# Patient Record
Sex: MALE | Race: BLACK OR AFRICAN AMERICAN | Employment: FULL TIME | URBAN - METROPOLITAN AREA
[De-identification: names, ages, dates, MRNs, and addresses within clinical notes are randomized per-mention and may not be internally consistent; named-entity substitution may affect disease eponyms.]

---

## 2017-02-09 RX ORDER — TRAZODONE HYDROCHLORIDE 50 MG/1
TABLET ORAL
Qty: 30 TAB | Refills: 5 | Status: SHIPPED | OUTPATIENT
Start: 2017-02-09 | End: 2017-03-14 | Stop reason: SDUPTHER

## 2017-02-10 RX ORDER — VALSARTAN AND HYDROCHLOROTHIAZIDE 80; 12.5 MG/1; MG/1
TABLET, FILM COATED ORAL
Qty: 30 TAB | Refills: 5 | Status: SHIPPED | OUTPATIENT
Start: 2017-02-10 | End: 2017-03-14 | Stop reason: ALTCHOICE

## 2017-03-14 ENCOUNTER — OFFICE VISIT (OUTPATIENT)
Dept: FAMILY MEDICINE CLINIC | Age: 72
End: 2017-03-14

## 2017-03-14 VITALS
BODY MASS INDEX: 27.57 KG/M2 | RESPIRATION RATE: 24 BRPM | DIASTOLIC BLOOD PRESSURE: 76 MMHG | HEART RATE: 70 BPM | TEMPERATURE: 97.9 F | HEIGHT: 70 IN | OXYGEN SATURATION: 95 % | SYSTOLIC BLOOD PRESSURE: 116 MMHG | WEIGHT: 192.6 LBS

## 2017-03-14 DIAGNOSIS — M15.9 PRIMARY OSTEOARTHRITIS INVOLVING MULTIPLE JOINTS: ICD-10-CM

## 2017-03-14 DIAGNOSIS — E78.2 MIXED HYPERLIPIDEMIA: ICD-10-CM

## 2017-03-14 DIAGNOSIS — G47.00 INSOMNIA, UNSPECIFIED TYPE: ICD-10-CM

## 2017-03-14 DIAGNOSIS — D64.9 NORMOCYTIC NORMOCHROMIC ANEMIA: ICD-10-CM

## 2017-03-14 DIAGNOSIS — C61 PROSTATE CANCER (HCC): ICD-10-CM

## 2017-03-14 DIAGNOSIS — I10 ESSENTIAL HYPERTENSION: Primary | ICD-10-CM

## 2017-03-14 DIAGNOSIS — H35.52 RETINITIS PIGMENTOSA: ICD-10-CM

## 2017-03-14 RX ORDER — TRAZODONE HYDROCHLORIDE 50 MG/1
TABLET ORAL
Qty: 30 TAB | Refills: 11 | Status: SHIPPED | OUTPATIENT
Start: 2017-03-14 | End: 2017-04-14 | Stop reason: SDUPTHER

## 2017-03-14 RX ORDER — AMLODIPINE BESYLATE 5 MG/1
TABLET ORAL
Qty: 30 TAB | Refills: 11 | Status: SHIPPED | OUTPATIENT
Start: 2017-03-14 | End: 2017-04-17 | Stop reason: SDUPTHER

## 2017-03-14 RX ORDER — VALSARTAN 80 MG/1
80 TABLET ORAL DAILY
Qty: 30 TAB | Refills: 11 | Status: SHIPPED | OUTPATIENT
Start: 2017-03-14

## 2017-03-14 NOTE — MR AVS SNAPSHOT
Visit Information Date & Time Provider Department Dept. Phone Encounter #  
 3/14/2017  2:00 PM [facility] Nguyen, [facility] [phone] [id number] Follow-up Instructions Return if symptoms worsen or fail to improve. Upcoming Health Maintenance Date Due Hepatitis C Screening 1945 ZOSTER VACCINE AGE 60> 5/25/2006 GLAUCOMA SCREENING Q2Y 5/25/2011 MEDICARE YEARLY EXAM 5/25/2011 Pneumococcal 65+ High/Highest Risk (2 of 2 - PCV13) 11/17/2014 FOBT Q 1 YEAR AGE 50-75 11/18/2017 DTaP/Tdap/Td series (2 - Td) 3/14/2027 Allergies as of 3/14/2017  Review Complete On: 3/14/2017 By: Corrie Cordero Severity Noted Reaction Type Reactions Accupril [Quinapril]  01/04/2011   Side Effect Nausea and Vomiting \"PT DOESN'T THINK HE IS ALLERGIC TO, HE WAS SICK AT THE SAME TIME\" Current Immunizations  Reviewed on 2/27/2015 Name Date Influenza High Dose Vaccine PF 12/1/2014 Influenza Vaccine 10/1/2013 Pneumococcal Polysaccharide (PPSV-23) 11/17/2013 12:53 PM  
  
 Not reviewed this visit You Were Diagnosed With   
  
 Codes Comments Essential hypertension    -  Primary ICD-10-CM: I10 
ICD-9-CM: 401.9 Mixed hyperlipidemia     ICD-10-CM: E78.2 ICD-9-CM: 272.2 Retinitis pigmentosa     ICD-10-CM: H35.52 
ICD-9-CM: 362.74 Primary osteoarthritis involving multiple joints     ICD-10-CM: M15.0 ICD-9-CM: 715.09 Prostate cancer [facility])     ICD-10-CM: I26 ICD-9-CM: 477 Normocytic normochromic anemia     ICD-10-CM: D64.9 ICD-9-CM: 285.9 Need for hepatitis C screening test     ICD-10-CM: Z11.59 
ICD-9-CM: V73.89 Vitals BP Pulse Temp Resp Height(growth percentile) Weight(growth percentile) 116/76 (BP 1 Location: Right arm, BP Patient Position: Sitting) 70 97.9 °F (36.6 °C) (Oral) 24 5' 10\" (1.778 m) 192 lb 9.6 oz (87.4 kg) SpO2 BMI Smoking Status 95% 27.64 kg/m2 Current Every Day Smoker Vitals History BMI and BSA Data Body Mass Index Body Surface Area  
 27.64 kg/m 2 2.08 m 2 Preferred Pharmacy Pharmacy Name Phone [facility] [address] [phone] Your Updated Medication List  
  
   
This list is accurate as of: 3/14/17  2:31 PM.  Always use your most recent med list. amLODIPine 5 mg tablet Commonly known as:  Arlyss Lyons TAKE 1 TABLET BY MOUTH EVERY DAY EXCEDRIN EXTRA STRENGTH PO Take  by mouth. traZODone 50 mg tablet Commonly known as:  DESYREL  
TAKE 1 TABLET BY MOUTH NIGHTLY.  
  
 valsartan 80 mg tablet Commonly known as:  DIOVAN Take  by mouth daily. zolpidem 5 mg tablet Commonly known as:  AMBIEN  
TAKE 2 TABLETS NIGHTLY AS NEEDED FOR SLEEP We Performed the Following AMB POC URINALYSIS DIP STICK AUTO W/O MICRO [95184 CPT(R)] CBC WITH AUTOMATED DIFF [59979 CPT(R)] FOLATE [id number] CPT(R)] HEPATITIS C AB [97481 CPT(R)] LIPID PANEL [89168 CPT(R)] METABOLIC PANEL, COMPREHENSIVE [66487 CPT(R)] RETICULOCYTE COUNT [id number] CPT(R)] VITAMIN B12 [id number] CPT(R)] Follow-up Instructions Return if symptoms worsen or fail to improve. Introducing [facility] [place] [facility]! Dear Cresencio Carr: Thank you for requesting a [employer] account. Our records indicate that you already have an active [employer] account. You can access your account anytime at [email or web address] Did you know that you can access your hospital and ER discharge instructions at any time in [employer]? You can also review all of your test results from your hospital stay or ER visit. Additional Information If you have questions, please visit the Frequently Asked Questions section of the [employer] website at [email or web address] Remember, [employer] is NOT to be used for urgent needs.  For medical emergencies, dial 911. Now available from your iPhone and Android! Please provide this summary of care documentation to your next provider. Your primary care clinician is listed as Jocelin Lr. If you have any questions after today's visit, please call [phone].

## 2017-03-14 NOTE — PROGRESS NOTES
HISTORY OF PRESENT ILLNESS  Rory Mallory is a 80 y.o. male. f/u hbp,oa,prostate ca,retinitis pigmentosa. Doing well,has happily moved to [place]. Has intermittent l lateral hand pain   Hypertension    The history is provided by the patient. This is a chronic problem. The problem has not changed since onset. Pertinent negatives include no chest pain, no orthopnea, no palpitations, no malaise/fatigue, no headaches, no peripheral edema and no shortness of breath. Hand Pain    This is a recurrent problem. The problem occurs daily. The problem has not changed since onset. The pain is present in the left hand. The quality of the pain is described as aching. The pain is at a severity of 3/10. The pain is moderate. Cholesterol Problem   This is a chronic problem. The problem occurs daily. The problem has not changed since onset. Pertinent negatives include no chest pain, no headaches and no shortness of breath. Review of Systems   Constitutional: Negative for fever and malaise/fatigue. Respiratory: Negative for shortness of breath. Cardiovascular: Negative for chest pain, palpitations and orthopnea. Genitourinary: Negative for dysuria, frequency and urgency. Musculoskeletal: Positive for joint pain. Neurological: Negative for headaches. Physical Exam   Constitutional: He is oriented to person, place, and time. He appears well-developed and well-nourished. HENT:   Head: Normocephalic and atraumatic. Right Ear: External ear normal.   Left Ear: External ear normal.   Nose: Nose normal.   Mouth/Throat: Oropharynx is clear and moist.   Eyes: Conjunctivae are normal. Pupils are equal, round, and reactive to light. Neck: Normal range of motion. Cardiovascular: Normal rate and regular rhythm. Pulmonary/Chest: Effort normal and breath sounds normal.   Abdominal: Soft.  Bowel sounds are normal.   Musculoskeletal:        Left elbow: Normal.        Cervical back: He exhibits normal range of motion, no tenderness and no deformity. Left hand: Normal.   Neurological: He is alert and oriented to person, place, and time. Skin: Skin is warm and dry. ASSESSMENT and PLAN  Rosalee Barney was seen today for well male. Diagnoses and all orders for this visit:    Essential hypertension,controlled  -     -     valsartan (DIOVAN) 80 mg tablet; Take 1 Tab by mouth daily. -     amLODIPine (NORVASC) 5 mg tablet; TAKE 1 TABLET BY MOUTH EVERY DAY    Mixed hyperlipidemia      Retinitis pigmentosa    Primary osteoarthritis involving multiple joints    Prostate cancer (HCC),needs uro f/u      Insomnia, unspecified type  -     traZODone (DESYREL) 50 mg tablet; TAKE 1 TABLET BY MOUTH NIGHTLY. Other orders  -         Follow-up Disposition:  Return if symptoms worsen or fail to improve.

## 2017-04-14 DIAGNOSIS — G47.00 INSOMNIA, UNSPECIFIED TYPE: ICD-10-CM

## 2017-04-14 RX ORDER — TRAZODONE HYDROCHLORIDE 50 MG/1
TABLET ORAL
Qty: 90 TAB | Refills: 3 | Status: SHIPPED | OUTPATIENT
Start: 2017-04-14 | End: 2017-04-24 | Stop reason: SDUPTHER

## 2017-04-17 DIAGNOSIS — I10 ESSENTIAL HYPERTENSION: ICD-10-CM

## 2017-04-17 RX ORDER — AMLODIPINE BESYLATE 5 MG/1
TABLET ORAL
Qty: 90 TAB | Refills: 4 | Status: SHIPPED | OUTPATIENT
Start: 2017-04-17 | End: 2017-04-24 | Stop reason: SDUPTHER

## 2017-04-24 DIAGNOSIS — G47.00 INSOMNIA, UNSPECIFIED TYPE: ICD-10-CM

## 2017-04-24 DIAGNOSIS — I10 ESSENTIAL HYPERTENSION: ICD-10-CM

## 2017-04-24 RX ORDER — AMLODIPINE BESYLATE 5 MG/1
TABLET ORAL
Qty: 90 TAB | Refills: 3 | Status: SHIPPED | OUTPATIENT
Start: 2017-04-24

## 2017-04-24 RX ORDER — TRAZODONE HYDROCHLORIDE 50 MG/1
TABLET ORAL
Qty: 90 TAB | Refills: 3 | Status: SHIPPED | OUTPATIENT
Start: 2017-04-24

## 2017-12-27 RX ORDER — SILDENAFIL 100 MG/1
100 TABLET, FILM COATED ORAL AS NEEDED
Qty: 6 TAB | Refills: 11 | Status: SHIPPED | OUTPATIENT
Start: 2017-12-27

## 2017-12-27 NOTE — TELEPHONE ENCOUNTER
Patient would like a refill on his Viagra. Patient can be reached at [phone] if there are any questions.

## 2018-01-03 RX ORDER — VALSARTAN AND HYDROCHLOROTHIAZIDE 80; 12.5 MG/1; MG/1
1 TABLET, FILM COATED ORAL DAILY
Qty: 90 TAB | Refills: 0 | Status: SHIPPED | OUTPATIENT
Start: 2018-01-03 | End: 2018-07-24 | Stop reason: SDUPTHER

## 2018-07-23 ENCOUNTER — OFFICE VISIT (OUTPATIENT)
Dept: INTERNAL MEDICINE | Facility: CLINIC | Age: 73
End: 2018-07-23
Payer: MEDICARE

## 2018-07-23 VITALS
HEIGHT: 70 IN | OXYGEN SATURATION: 96 % | BODY MASS INDEX: 25.06 KG/M2 | TEMPERATURE: 97 F | WEIGHT: 175.06 LBS | SYSTOLIC BLOOD PRESSURE: 117 MMHG | HEART RATE: 122 BPM | DIASTOLIC BLOOD PRESSURE: 89 MMHG

## 2018-07-23 DIAGNOSIS — R63.4 WEIGHT LOSS: ICD-10-CM

## 2018-07-23 DIAGNOSIS — R74.8 ELEVATED SERUM ALKALINE PHOSPHATASE LEVEL: ICD-10-CM

## 2018-07-23 DIAGNOSIS — R53.83 FATIGUE, UNSPECIFIED TYPE: ICD-10-CM

## 2018-07-23 DIAGNOSIS — I10 HYPERTENSION, WELL CONTROLLED: ICD-10-CM

## 2018-07-23 DIAGNOSIS — C61 CANCER OF PROSTATE: Primary | ICD-10-CM

## 2018-07-23 DIAGNOSIS — R97.20 ELEVATED PSA: ICD-10-CM

## 2018-07-23 PROCEDURE — 99204 OFFICE O/P NEW MOD 45 MIN: CPT | Mod: S$GLB,,, | Performed by: FAMILY MEDICINE

## 2018-07-23 PROCEDURE — 99999 PR PBB SHADOW E&M-NEW PATIENT-LVL III: CPT | Mod: PBBFAC,,, | Performed by: FAMILY MEDICINE

## 2018-07-23 RX ORDER — VALSARTAN AND HYDROCHLOROTHIAZIDE 80; 12.5 MG/1; MG/1
TABLET, FILM COATED ORAL
COMMUNITY
Start: 2018-04-28

## 2018-07-23 RX ORDER — DIPHENHYDRAMINE HCL 25 MG
50 TABLET ORAL
COMMUNITY

## 2018-07-23 NOTE — PROGRESS NOTES
Subjective:       Patient ID: Dante Nagy is a 73 y.o. male.    Chief Complaint: Annual Exam    New patient establish care.  Medical history includes hypertension and prostate cancer and retinitis pigmentosa.  He was treated with radical prostatectomy 2007 for prostate cancer.  He has been monitored on annual basis.  PSA 1 year ago was 0.64.  He reports it has slowly increased over the last few years.  He was recently treated in emergency room for probable dehydration.  Lab reports included alk phosphatase elevated 262 and a [facility].  He is scheduled to see Urology in [facility] in 2 days.  He has a 1 month duration fatigue weight loss decreased appetite.  One month ago his weight was 192 and now 175.  He has possible vague bone pain. He denies chest pain headache palpitations shortness of breath.      Review of Systems   Constitutional: Positive for appetite change, fatigue and unexpected weight change. Negative for chills and fever.   Respiratory: Negative for cough, chest tightness, shortness of breath and wheezing.    Cardiovascular: Negative for chest pain, palpitations and leg swelling.        Denies syncope   Gastrointestinal: Negative for abdominal distention, abdominal pain, nausea and vomiting.   Genitourinary: Negative for difficulty urinating, dysuria, frequency, hematuria and urgency.   Musculoskeletal:        Possible vague bone pain including sternal area   Skin: Negative for rash.   Neurological: Negative for dizziness, syncope, speech difficulty and headaches.       Objective:      Physical Exam   Constitutional: He is oriented to person, place, and time. He appears well-developed and well-nourished. No distress.   HENT:   Right Ear: External ear normal.   Left Ear: External ear normal.   Nose: Nose normal.   Mouth/Throat: Oropharynx is clear and moist.   Eyes: Conjunctivae are normal. Pupils are equal, round, and reactive to light.   Neck: Neck supple. No JVD present. No thyromegaly  present.   Cardiovascular: Normal rate, regular rhythm and normal heart sounds.    No murmur heard.  Pulmonary/Chest: Effort normal and breath sounds normal. No respiratory distress. He has no wheezes.   Abdominal: Soft. Bowel sounds are normal. He exhibits no mass. There is no tenderness.   Lymphadenopathy:     He has no cervical adenopathy.   Neurological: He is alert and oriented to person, place, and time. No cranial nerve deficit. He exhibits normal muscle tone. Coordination normal.   Skin: No rash noted. He is not diaphoretic.       Historical on 02/25/2009   Component Date Value Ref Range Status    Troponin I 02/25/2009 <0.006  0.006 - 0.026 ng/ml Final    Glucose 02/25/2009 91  70 - 110 mg/dl Final    BUN, Bld 02/25/2009 13  8 - 23 mg/dl Final    Creatinine 02/25/2009 1.1  0.5 - 1.4 mg/dl Final    Calcium 02/25/2009 10.3  8.7 - 10.5 mg/dl Final    Sodium 02/25/2009 141  136 - 145 mMol/l Final    Potassium 02/25/2009 4.0  3.5 - 5.1 mMol/l Final    Chloride 02/25/2009 104  95 - 110 mMol/l Final    Total Protein 02/25/2009 7.7  6.0 - 8.4 gm/dl Final    Albumin 02/25/2009 5.3* 3.5 - 5.2 g/dl Final    Total Bilirubin 02/25/2009 0.8  0.1 - 1.0 mg/dl Final    AST 02/25/2009 26  10 - 40 U/L Final    Alkaline Phosphatase 02/25/2009 82  55 - 135 U/L Final    CO2 02/25/2009 22* 23.0 - 29.0 mEq/L Final    ALT 02/25/2009 27  10 - 44 U/L Final    CPK MB 02/25/2009 3.0  0.1 - 6.5 ng/mL Final    MB% 02/25/2009 0.5  0 - 5 % Final    CPK 02/25/2009 571* 20 - 200 U/L Final    Salicylate Lvl 02/25/2009 6.0  5.0 - 29.0 mg/dl Final    Alcohol, Medical, Serum 02/25/2009 <10  0 - 10 mg/dl Final    Acetaminophen (Tylenol), Serum 02/25/2009 7.6* 10.0 - 20.0 ug/ml Final    TCA Scrn 02/25/2009 <20.0  ng/mL Final    D-Dimer (Thrombosis) 02/25/2009 518.62* 0 - 499 ng/mlFEU Final    WBC 02/25/2009 10.45  4.8 - 10.8 K/uL Final    RBC 02/25/2009 4.49* 4.60 - 6.20 M/uL Final    Hemoglobin 02/25/2009 13.5* 14.0 -  18.0 gm/dl Final    Hematocrit 02/25/2009 40.7  40.0 - 54.0 % Final    MCV 02/25/2009 90.6  82 - 95 fL Final    MCH 02/25/2009 30.1  27 - 31 pg Final    MCHC 02/25/2009 33.2  32 - 36 % Final    RDW 02/25/2009 14.3  11.5 - 14.5 % Final    Gran% 02/25/2009 68.7  38 - 73 % Final    Lymph% 02/25/2009 18.9* 21 - 44 % Final    Mono% 02/25/2009 11.1* 0.0 - 7.4 % Final    Eosinophil% 02/25/2009 1.1  0.0 - 4.2 % Final    Basophil% 02/25/2009 0.2  0 - 1.9 % Final    Gran # (ANC) 02/25/2009 7.2  1.8 - 7.7 K/uL Final    Lymph # 02/25/2009 2.0  1 - 4.8 K/uL Final    Mono # 02/25/2009 1.2* 0.0 - 0.8 K/uL Final    Eos # 02/25/2009 0.1  0 - 0.45 K/uL Final    Baso # 02/25/2009 0.0  0.0 - 0.2 K/uL Final    Platelets 02/25/2009 199  150 - 350 K/uL Final    MPV 02/25/2009 10.5  9.2 - 12.9 fL Final     Assessment:       No diagnosis found.    Plan:   Clinical concern of metastatic prostate cancer.  Urology evaluation as planned.  Blood pressure is 117/89.  Emergency room record EKG chest x-ray are reviewed.  Chest x-ray report is normal.  Follow-up in 1 month.      There are no diagnoses linked to this encounter.

## 2018-07-24 RX ORDER — VALSARTAN AND HYDROCHLOROTHIAZIDE 80; 12.5 MG/1; MG/1
TABLET, FILM COATED ORAL
Qty: 90 TAB | Refills: 0 | Status: SHIPPED | OUTPATIENT
Start: 2018-07-24 | End: 2018-10-23 | Stop reason: SDUPTHER

## 2018-08-03 ENCOUNTER — TELEPHONE (OUTPATIENT)
Dept: FAMILY MEDICINE CLINIC | Age: 73
End: 2018-08-03

## 2018-08-03 NOTE — TELEPHONE ENCOUNTER
Patient phoned no answer voicemail message left to inquire with pharmacy if he may have been dispensed pills (Valsartan) form a contaminated lot. Please call office if you have any questions or concerns.

## 2018-08-09 ENCOUNTER — PATIENT OUTREACH (OUTPATIENT)
Dept: ADMINISTRATIVE | Facility: HOSPITAL | Age: 73
End: 2018-08-09

## 2018-08-09 NOTE — LETTER
August 9, 2018        Dante Nagy  [address] [facility] [address]      Dear Mr. Nagy,    You have an upcoming appointment with Percy Najera MD on 08/23/18.      Your chart is indicating you may be due for the following and I will be happy to assist you in scheduling any needed appointments:  Health Maintenance Due   Topic    Hepatitis C Screening     Lipid Panel     TETANUS VACCINE     Colonoscopy     Zoster Vaccine     Pneumococcal (65+) (1 of 2 - PCV13)    Abdominal Aortic Aneurysm Screening     Influenza Vaccine           If you have had any of the above done at another facility, please bring the records or information with you so that your record at [facility] will be complete.    We will be happy to assist you with scheduling any necessary appointments or you may contact the [facility] appointment desk at [phone] to schedule at your convenience.     Thank you for choosing [facility] for your healthcare needs,    Alycia SNOW, LPN Care Coordinator  [facility]  [phone]

## 2018-08-23 ENCOUNTER — OFFICE VISIT (OUTPATIENT)
Dept: INTERNAL MEDICINE | Facility: CLINIC | Age: 73
End: 2018-08-23
Payer: MEDICARE

## 2018-08-23 VITALS
HEART RATE: 88 BPM | TEMPERATURE: 98 F | DIASTOLIC BLOOD PRESSURE: 79 MMHG | SYSTOLIC BLOOD PRESSURE: 108 MMHG | OXYGEN SATURATION: 97 % | WEIGHT: 171.88 LBS | BODY MASS INDEX: 24.61 KG/M2 | HEIGHT: 70 IN

## 2018-08-23 DIAGNOSIS — C61 CANCER OF PROSTATE: Primary | ICD-10-CM

## 2018-08-23 DIAGNOSIS — I10 HYPERTENSION, WELL CONTROLLED: ICD-10-CM

## 2018-08-23 PROCEDURE — 99213 OFFICE O/P EST LOW 20 MIN: CPT | Mod: S$GLB,,, | Performed by: FAMILY MEDICINE

## 2018-08-23 PROCEDURE — 99999 PR PBB SHADOW E&M-EST. PATIENT-LVL III: CPT | Mod: PBBFAC,,, | Performed by: FAMILY MEDICINE

## 2018-08-23 RX ORDER — VALSARTAN AND HYDROCHLOROTHIAZIDE 160; 12.5 MG/1; MG/1
1 TABLET, FILM COATED ORAL
COMMUNITY

## 2018-08-23 RX ORDER — AMLODIPINE BESYLATE 5 MG/1
5 TABLET ORAL DAILY
COMMUNITY

## 2018-08-23 RX ORDER — BICALUTAMIDE 50 MG/1
50 TABLET, FILM COATED ORAL DAILY
COMMUNITY

## 2018-08-23 NOTE — PROGRESS NOTES
Subjective:       Patient ID: Dante Nagy is a 73 y.o. male.    Chief Complaint: Follow-up    Since last visit he has been evaluated by Urology.  He is scheduled to see Oncology as well.  He was started on Casodex and is receiving an every 6 month injection.  Bone scan was done with metastatic lesions of his thighs scapula and left hip area he describes minimal bone pain. He reports his appetite has increased but  his weight was 175 lb last visit and 171 lb this visit.      Review of Systems   Constitutional: Positive for fatigue. Negative for appetite change and unexpected weight change.   Respiratory: Negative for cough, shortness of breath and wheezing.    Cardiovascular: Negative for chest pain, palpitations and leg swelling.   Gastrointestinal: Negative for abdominal pain.   Neurological: Negative for headaches.       Objective:      Physical Exam   Constitutional: He is oriented to person, place, and time. He appears well-developed and well-nourished. No distress.   Neck: Neck supple. No thyromegaly present.   Cardiovascular: Normal rate, regular rhythm and normal heart sounds.   No murmur heard.  Pulmonary/Chest: Effort normal and breath sounds normal. No respiratory distress. He has no wheezes.   Abdominal: Soft. Bowel sounds are normal. He exhibits no mass. There is no tenderness.   Lymphadenopathy:     He has no cervical adenopathy.   Neurological: He is alert and oriented to person, place, and time.       Historical on 02/25/2009   Component Date Value Ref Range Status    Troponin I 02/25/2009 <0.006  0.006 - 0.026 ng/ml Final    Glucose 02/25/2009 91  70 - 110 mg/dl Final    BUN, Bld 02/25/2009 13  8 - 23 mg/dl Final    Creatinine 02/25/2009 1.1  0.5 - 1.4 mg/dl Final    Calcium 02/25/2009 10.3  8.7 - 10.5 mg/dl Final    Sodium 02/25/2009 141  136 - 145 mMol/l Final    Potassium 02/25/2009 4.0  3.5 - 5.1 mMol/l Final    Chloride 02/25/2009 104  95 - 110 mMol/l Final    Total Protein 02/25/2009 7.7   6.0 - 8.4 gm/dl Final    Albumin 02/25/2009 5.3* 3.5 - 5.2 g/dl Final    Total Bilirubin 02/25/2009 0.8  0.1 - 1.0 mg/dl Final    AST 02/25/2009 26  10 - 40 U/L Final    Alkaline Phosphatase 02/25/2009 82  55 - 135 U/L Final    CO2 02/25/2009 22* 23.0 - 29.0 mEq/L Final    ALT 02/25/2009 27  10 - 44 U/L Final    CPK MB 02/25/2009 3.0  0.1 - 6.5 ng/mL Final    MB% 02/25/2009 0.5  0 - 5 % Final    CPK 02/25/2009 571* 20 - 200 U/L Final    Salicylate Lvl 02/25/2009 6.0  5.0 - 29.0 mg/dl Final    Alcohol, Medical, Serum 02/25/2009 <10  0 - 10 mg/dl Final    Acetaminophen (Tylenol), Serum 02/25/2009 7.6* 10.0 - 20.0 ug/ml Final    TCA Scrn 02/25/2009 <20.0  ng/mL Final    D-Dimer (Thrombosis) 02/25/2009 518.62* 0 - 499 ng/mlFEU Final    WBC 02/25/2009 10.45  4.8 - 10.8 K/uL Final    RBC 02/25/2009 4.49* 4.60 - 6.20 M/uL Final    Hemoglobin 02/25/2009 13.5* 14.0 - 18.0 gm/dl Final    Hematocrit 02/25/2009 40.7  40.0 - 54.0 % Final    MCV 02/25/2009 90.6  82 - 95 fL Final    MCH 02/25/2009 30.1  27 - 31 pg Final    MCHC 02/25/2009 33.2  32 - 36 % Final    RDW 02/25/2009 14.3  11.5 - 14.5 % Final    Gran% 02/25/2009 68.7  38 - 73 % Final    Lymph% 02/25/2009 18.9* 21 - 44 % Final    Mono% 02/25/2009 11.1* 0.0 - 7.4 % Final    Eosinophil% 02/25/2009 1.1  0.0 - 4.2 % Final    Basophil% 02/25/2009 0.2  0 - 1.9 % Final    Gran # (ANC) 02/25/2009 7.2  1.8 - 7.7 K/uL Final    Lymph # 02/25/2009 2.0  1 - 4.8 K/uL Final    Mono # 02/25/2009 1.2* 0.0 - 0.8 K/uL Final    Eos # 02/25/2009 0.1  0 - 0.45 K/uL Final    Baso # 02/25/2009 0.0  0.0 - 0.2 K/uL Final    Platelets 02/25/2009 199  150 - 350 K/uL Final    MPV 02/25/2009 10.5  9.2 - 12.9 fL Final     Assessment:       No diagnosis found.    Plan:   Has 2 different dosages of I will start listed.  He will confirm correct dose with pharmacist.  Discussed diagnosis of metastatic prostate cancer.  Continue Urology oncology follow-up in follow-up in 3  months.  Discussed need for flu VAX shingles immunization.  He will check his records regards pneumococcal immunization.  Blood pressure today 108/79.      There are no diagnoses linked to this encounter.

## 2018-10-23 RX ORDER — VALSARTAN AND HYDROCHLOROTHIAZIDE 80; 12.5 MG/1; MG/1
TABLET, FILM COATED ORAL
Qty: 90 TAB | Refills: 0 | Status: SHIPPED | OUTPATIENT
Start: 2018-10-23 | End: 2019-01-20 | Stop reason: SDUPTHER

## 2018-11-12 ENCOUNTER — PATIENT OUTREACH (OUTPATIENT)
Dept: ADMINISTRATIVE | Facility: HOSPITAL | Age: 73
End: 2018-11-12

## 2018-11-12 NOTE — LETTER
November 12, 2018        Dante Nagy  [address] [facility] [address]      Dear Mr. Nagy,    You have an upcoming appointment with Percy Najera MD on 11/26/18.      Your chart is indicating you may be due for the following and I will be happy to assist you in scheduling any needed appointments:  Health Maintenance Due   Topic    Hepatitis C Screening     Lipid Panel     TETANUS VACCINE     Colonoscopy     Zoster Vaccine     Pneumococcal (65+) (1 of 2 - PCV13)    Abdominal Aortic Aneurysm Screening     Influenza Vaccine           If you have had any of the above done at another facility, please bring the records or information with you so that your record at [facility] will be complete.    We will be happy to assist you with scheduling any necessary appointments or you may contact the [facility] appointment desk at [phone] to schedule at your convenience.     Thank you for choosing [facility] for your healthcare needs,      Alycia SNOW, LPN Care Coordinator  [facility]  [phone]

## 2019-01-20 RX ORDER — VALSARTAN AND HYDROCHLOROTHIAZIDE 80; 12.5 MG/1; MG/1
TABLET, FILM COATED ORAL
Qty: 90 TAB | Refills: 0 | Status: SHIPPED | OUTPATIENT
Start: 2019-01-20

## 2019-02-01 RX ORDER — VALSARTAN AND HYDROCHLOROTHIAZIDE 80; 12.5 MG/1; MG/1
TABLET, FILM COATED ORAL
Qty: 90 TAB | Refills: 0 | Status: SHIPPED | OUTPATIENT
Start: 2019-02-01 | End: 2019-07-26 | Stop reason: SDUPTHER

## 2019-05-02 DIAGNOSIS — I10 ESSENTIAL HYPERTENSION: ICD-10-CM

## 2019-05-03 RX ORDER — AMLODIPINE BESYLATE 5 MG/1
TABLET ORAL
Qty: 90 TAB | Refills: 0 | Status: SHIPPED | OUTPATIENT
Start: 2019-05-03 | End: 2019-07-26 | Stop reason: SDUPTHER

## 2019-05-07 DIAGNOSIS — Z12.11 COLON CANCER SCREENING: ICD-10-CM

## 2019-07-26 DIAGNOSIS — I10 ESSENTIAL HYPERTENSION: ICD-10-CM

## 2019-07-26 RX ORDER — VALSARTAN AND HYDROCHLOROTHIAZIDE 80; 12.5 MG/1; MG/1
TABLET, FILM COATED ORAL
Qty: 90 TAB | Refills: 0 | Status: SHIPPED | OUTPATIENT
Start: 2019-07-26 | End: 2019-10-26 | Stop reason: SDUPTHER

## 2019-07-26 RX ORDER — AMLODIPINE BESYLATE 5 MG/1
TABLET ORAL
Qty: 90 TAB | Refills: 0 | Status: SHIPPED | OUTPATIENT
Start: 2019-07-26 | End: 2019-10-21 | Stop reason: SDUPTHER

## 2019-10-21 DIAGNOSIS — I10 ESSENTIAL HYPERTENSION: ICD-10-CM

## 2019-10-21 RX ORDER — AMLODIPINE BESYLATE 5 MG/1
TABLET ORAL
Qty: 90 TAB | Refills: 0 | Status: SHIPPED | OUTPATIENT
Start: 2019-10-21

## 2019-10-27 RX ORDER — VALSARTAN AND HYDROCHLOROTHIAZIDE 80; 12.5 MG/1; MG/1
TABLET, FILM COATED ORAL
Qty: 90 TAB | Refills: 0 | Status: SHIPPED | OUTPATIENT
Start: 2019-10-27 | End: 2019-10-31 | Stop reason: SDUPTHER

## 2019-11-01 RX ORDER — VALSARTAN AND HYDROCHLOROTHIAZIDE 80; 12.5 MG/1; MG/1
TABLET, FILM COATED ORAL
Qty: 90 TAB | Refills: 0 | Status: SHIPPED | OUTPATIENT
Start: 2019-11-01 | End: 2020-05-25

## 2019-11-04 ENCOUNTER — PATIENT OUTREACH (OUTPATIENT)
Dept: ADMINISTRATIVE | Facility: HOSPITAL | Age: 74
End: 2019-11-04

## 2020-05-25 RX ORDER — VALSARTAN AND HYDROCHLOROTHIAZIDE 80; 12.5 MG/1; MG/1
TABLET, FILM COATED ORAL
Qty: 90 TAB | Refills: 0 | Status: SHIPPED | OUTPATIENT
Start: 2020-05-25 | End: 2020-08-24

## 2020-10-28 ENCOUNTER — PES CALL (OUTPATIENT)
Dept: ADMINISTRATIVE | Facility: CLINIC | Age: 75
End: 2020-10-28

## 2021-08-03 PROBLEM — I10 HYPERTENSION: Status: RESOLVED | Noted: 2021-08-03 | Resolved: 2021-08-03
